# Patient Record
Sex: FEMALE | Race: WHITE | NOT HISPANIC OR LATINO | Employment: FULL TIME | URBAN - METROPOLITAN AREA
[De-identification: names, ages, dates, MRNs, and addresses within clinical notes are randomized per-mention and may not be internally consistent; named-entity substitution may affect disease eponyms.]

---

## 2020-11-19 ENCOUNTER — HOSPITAL ENCOUNTER (OUTPATIENT)
Dept: URGENT CARE | Facility: CLINIC | Age: 20
Discharge: HOME | End: 2020-11-19
Attending: PHYSICIAN ASSISTANT

## 2020-11-22 LAB — SARS-COV-2 RNA SPEC QL NAA+PROBE: NOT DETECTED

## 2021-03-11 ENCOUNTER — OFFICE VISIT (OUTPATIENT)
Dept: FAMILY MEDICINE CLINIC | Facility: CLINIC | Age: 21
End: 2021-03-11

## 2021-03-11 VITALS
WEIGHT: 193 LBS | DIASTOLIC BLOOD PRESSURE: 78 MMHG | OXYGEN SATURATION: 98 % | RESPIRATION RATE: 16 BRPM | HEART RATE: 62 BPM | TEMPERATURE: 96.6 F | SYSTOLIC BLOOD PRESSURE: 128 MMHG | BODY MASS INDEX: 35.51 KG/M2 | HEIGHT: 62 IN

## 2021-03-11 DIAGNOSIS — Z11.59 NEED FOR HEPATITIS C SCREENING TEST: ICD-10-CM

## 2021-03-11 DIAGNOSIS — F32.A DEPRESSION, UNSPECIFIED DEPRESSION TYPE: ICD-10-CM

## 2021-03-11 DIAGNOSIS — N92.6 IRREGULAR PERIODS: ICD-10-CM

## 2021-03-11 DIAGNOSIS — Z00.00 ANNUAL PHYSICAL EXAM: Primary | ICD-10-CM

## 2021-03-11 DIAGNOSIS — Z13.220 SCREENING FOR HYPERLIPIDEMIA: ICD-10-CM

## 2021-03-11 PROBLEM — L72.3 SEBACEOUS CYST: Status: ACTIVE | Noted: 2021-01-05

## 2021-03-11 LAB
BASOPHILS # BLD AUTO: 0.06 10*3/MM3 (ref 0–0.2)
BASOPHILS NFR BLD AUTO: 0.9 % (ref 0–1.5)
EOSINOPHIL # BLD AUTO: 0.58 10*3/MM3 (ref 0–0.4)
EOSINOPHIL NFR BLD AUTO: 9.1 % (ref 0.3–6.2)
ERYTHROCYTE [DISTWIDTH] IN BLOOD BY AUTOMATED COUNT: 11.9 % (ref 12.3–15.4)
HCT VFR BLD AUTO: 42.9 % (ref 34–46.6)
HGB BLD-MCNC: 14.3 G/DL (ref 12–15.9)
IMM GRANULOCYTES # BLD AUTO: 0.01 10*3/MM3 (ref 0–0.05)
IMM GRANULOCYTES NFR BLD AUTO: 0.2 % (ref 0–0.5)
LYMPHOCYTES # BLD AUTO: 2.15 10*3/MM3 (ref 0.7–3.1)
LYMPHOCYTES NFR BLD AUTO: 33.7 % (ref 19.6–45.3)
MCH RBC QN AUTO: 30.6 PG (ref 26.6–33)
MCHC RBC AUTO-ENTMCNC: 33.3 G/DL (ref 31.5–35.7)
MCV RBC AUTO: 91.9 FL (ref 79–97)
MONOCYTES # BLD AUTO: 0.4 10*3/MM3 (ref 0.1–0.9)
MONOCYTES NFR BLD AUTO: 6.3 % (ref 5–12)
NEUTROPHILS # BLD AUTO: 3.18 10*3/MM3 (ref 1.7–7)
NEUTROPHILS NFR BLD AUTO: 49.8 % (ref 42.7–76)
NRBC BLD AUTO-RTO: 0 /100 WBC (ref 0–0.2)
PLATELET # BLD AUTO: 282 10*3/MM3 (ref 140–450)
RBC # BLD AUTO: 4.67 10*6/MM3 (ref 3.77–5.28)
WBC # BLD AUTO: 6.38 10*3/MM3 (ref 3.4–10.8)

## 2021-03-11 PROCEDURE — 99385 PREV VISIT NEW AGE 18-39: CPT | Performed by: NURSE PRACTITIONER

## 2021-03-11 NOTE — PATIENT INSTRUCTIONS
You will need to call [facility] to make an appointment.   I will call you with your lab results.   Please call with any questions or concerns.   Return for Annual, Labs.    Annual Wellness  Personal Prevention Plan of Service     Date of Office Visit:  2021  Encounter Provider:  MANE Devlin  Place of Service:  [facility]  Patient Name: Liz Pool  :  2000    As part of the Annual Wellness portion of your visit today, we are providing you with this personalized preventive plan of services (PPPS). This plan is based upon recommendations of the [place] Preventive Services Task Force (USPSTF) and the Advisory Committee on Immunization Practices (ACIP).    This lists the preventive care services that should be considered, and provides dates of when you are due. Items listed as completed are up-to-date and do not require any further intervention.    Health Maintenance   Topic Date Due   • HPV VACCINES (1 - 2-dose series) Never done   • TDAP/TD VACCINES (2 - Td) 2018   • HEPATITIS C SCREENING  Never done   • INFLUENZA VACCINE  2022 (Originally 2020)   • CHLAMYDIA SCREENING  2022 (Originally 3/11/2021)   • ANNUAL PHYSICAL  2022   • Pneumococcal Vaccine 0-64  Aged Out   • MENINGOCOCCAL VACCINE  Aged Out       Orders Placed This Encounter   Procedures   • Comprehensive Metabolic Panel   • Hepatitis C Antibody   • Lipid Panel With / Chol / HDL Ratio   • TSH   • Ambulatory Referral to Obstetrics / Gynecology     Referral Priority:   Routine     Referral Type:   Consultation     Referral Reason:   Specialty Services Required     Referred to Provider:   Lizette Sandoval MD     Requested Specialty:   Obstetrics and Gynecology     Number of Visits Requested:   1   • Ambulatory Referral to Psychiatry     Referral Priority:   Routine     Referral Type:   Behavorial Health/Psych     Referral Reason:   Specialty  Services Required     Requested Specialty:   Psychiatry     Number of Visits Requested:   1   • CBC & Differential     Order Specific Question:   Manual Differential     Answer:   No       Return for Annual, Labs.

## 2021-03-11 NOTE — PROGRESS NOTES
"Preventive Exam    History of Present Illness: Liz Pool is a 20 y.o. here for check up and review of routine health maintenance. she states she is doing well and has concerns with depression. She reports that her mother and father are both bipolar and would like screening.     Patient also reports irregular periods. She states that she has one \"every other month\". She states that this issue has worsened over the last year. She reports that she will have a period for about \"2 weeks\" once she does have one\". Patient reports that she started her period at age 9.     Past medical history, surgical history and family history have been reviewed.     Review of Systems   Constitutional: Positive for fatigue. Negative for appetite change, chills and fever.   HENT: Negative for congestion, dental problem, postnasal drip, rhinorrhea, sinus pressure and sore throat.         Dental exam is up to date.    Eyes: Negative.  Negative for blurred vision, double vision and photophobia.        Wears glasses. Eye exam is up to date.    Respiratory: Negative for cough, chest tightness, shortness of breath and wheezing.    Cardiovascular: Negative for chest pain, palpitations and leg swelling.   Gastrointestinal: Positive for indigestion. Negative for abdominal pain, constipation, diarrhea, nausea and vomiting.   Endocrine: Negative.  Negative for cold intolerance and heat intolerance.   Genitourinary: Positive for menstrual problem (irregular) and pelvic pain (with menstrual cycle. ). Negative for breast discharge, breast lump, breast pain, difficulty urinating, frequency, pelvic pressure, vaginal bleeding and vaginal discharge.   Musculoskeletal: Negative for arthralgias, back pain, joint swelling and myalgias.   Skin: Negative.    Allergic/Immunologic: Positive for food allergies (lactose intolerant). Negative for environmental allergies.   Neurological: Negative for dizziness, weakness, numbness and headache. "   Hematological: Bruises/bleeds easily.   Psychiatric/Behavioral: Positive for sleep disturbance and depressed mood. Negative for self-injury and suicidal ideas. The patient is nervous/anxious.        PHYSICAL EXAM    Vitals:    03/11/21 1035   BP: 128/78   Pulse: 62   Resp: 16   Temp: 96.6 °F (35.9 °C)   SpO2: 98%     Body mass index is 35.52 kg/m².      Physical Exam  Vitals and nursing note reviewed.   Constitutional:       Appearance: Normal appearance. She is well-developed. She is obese.   HENT:      Head: Normocephalic and atraumatic.      Right Ear: Tympanic membrane, ear canal and external ear normal.      Left Ear: Tympanic membrane, ear canal and external ear normal.      Nose: Nose normal.      Mouth/Throat:      Lips: Pink.      Mouth: Mucous membranes are moist.      Tongue: No lesions.      Palate: No mass and lesions.      Pharynx: Oropharynx is clear. Uvula midline.      Tonsils: No tonsillar exudate.   Eyes:      Conjunctiva/sclera: Conjunctivae normal.      Pupils: Pupils are equal, round, and reactive to light.   Neck:      Thyroid: No thyromegaly.   Cardiovascular:      Rate and Rhythm: Normal rate and regular rhythm.      Pulses: Normal pulses.           Dorsalis pedis pulses are 2+ on the right side and 2+ on the left side.        Posterior tibial pulses are 2+ on the right side and 2+ on the left side.      Heart sounds: Normal heart sounds. No murmur.   Pulmonary:      Effort: Pulmonary effort is normal.      Breath sounds: Normal breath sounds.   Abdominal:      General: Bowel sounds are normal. There is no distension.      Palpations: Abdomen is soft.      Tenderness: There is no abdominal tenderness.   Musculoskeletal:         General: No deformity. Normal range of motion.      Cervical back: Normal range of motion and neck supple.      Right lower leg: No edema.      Left lower leg: No edema.   Lymphadenopathy:      Head:      Right side of head: No submental, submandibular, tonsillar,  preauricular, posterior auricular or occipital adenopathy.      Left side of head: No submental, submandibular, tonsillar, preauricular, posterior auricular or occipital adenopathy.      Cervical: No cervical adenopathy.      Right cervical: No superficial, deep or posterior cervical adenopathy.     Left cervical: No superficial, deep or posterior cervical adenopathy.      Upper Body:      Right upper body: No supraclavicular adenopathy.      Left upper body: No supraclavicular adenopathy.   Skin:     General: Skin is warm and dry.      Capillary Refill: Capillary refill takes 2 to 3 seconds.   Neurological:      General: No focal deficit present.      Mental Status: She is alert and oriented to person, place, and time.      Cranial Nerves: Cranial nerves are intact. No cranial nerve deficit.      Sensory: Sensation is intact.      Motor: Motor function is intact.      Coordination: Coordination is intact.      Gait: Gait is intact.   Psychiatric:         Attention and Perception: Attention and perception normal.         Mood and Affect: Mood and affect normal.         Speech: Speech normal.         Behavior: Behavior normal. Behavior is cooperative.         Thought Content: Thought content normal.         Cognition and Memory: Cognition and memory normal.         Judgment: Judgment normal.         Procedures    Diagnoses and all orders for this visit:    1. Annual physical exam (Primary)  -     CBC & Differential  -     Comprehensive Metabolic Panel  -     Hepatitis C Antibody  -     Lipid Panel With / Chol / HDL Ratio    2. Screening for hyperlipidemia  -     Lipid Panel With / Chol / HDL Ratio    3. Need for hepatitis C screening test  -     Hepatitis C Antibody    4. Depression, unspecified depression type  -     CBC & Differential  -     Comprehensive Metabolic Panel  -     TSH  -     Ambulatory Referral to Psychiatry    5. Irregular periods  -     Ambulatory Referral to Obstetrics / Gynecology        Problems  Addressed this Visit     None      Visit Diagnoses     Annual physical exam    -  Primary    Relevant Orders    CBC & Differential    Comprehensive Metabolic Panel    Hepatitis C Antibody    Lipid Panel With / Chol / HDL Ratio    Screening for hyperlipidemia        Relevant Orders    Lipid Panel With / Chol / HDL Ratio    Need for hepatitis C screening test        Relevant Orders    Hepatitis C Antibody    Depression, unspecified depression type        Relevant Orders    CBC & Differential    Comprehensive Metabolic Panel    TSH    Ambulatory Referral to Psychiatry (Completed)    Irregular periods        Relevant Orders    Ambulatory Referral to Obstetrics / Gynecology      Diagnoses       Codes Comments    Annual physical exam    -  Primary ICD-10-CM: Z00.00  ICD-9-CM: V70.0     Screening for hyperlipidemia     ICD-10-CM: Z13.220  ICD-9-CM: V77.91     Need for hepatitis C screening test     ICD-10-CM: Z11.59  ICD-9-CM: V73.89     Depression, unspecified depression type     ICD-10-CM: F32.9  ICD-9-CM: 311     Irregular periods     ICD-10-CM: N92.6  ICD-9-CM: 626.4         Patient screened positive for depression based on a PHQ-9 score of 12 on 3/11/2021. Follow-up recommendations include: Referral to Mental Health specialist.  Routine health maintenance reviewed and discussed with Liz Pool.    Preventative counseling regarding healthy diet and exercise.   Pt reports that he wears a seatbelt regularly.    Return for Annual, Labs.     Patient Instructions     You will need to call [facility] to make an appointment.   I will call you with your lab results.   Please call with any questions or concerns.   Return for Annual, Labs.    Annual Wellness  Personal Prevention Plan of Service     Date of Office Visit:  2021  Encounter Provider:  MANE Devlin  Place of Service:  [facility]  Patient Name: Liz Pool  :  2000    As part  of the Annual Wellness portion of your visit today, we are providing you with this personalized preventive plan of services (PPPS). This plan is based upon recommendations of the [place] Preventive Services Task Force (USPSTF) and the Advisory Committee on Immunization Practices (ACIP).    This lists the preventive care services that should be considered, and provides dates of when you are due. Items listed as completed are up-to-date and do not require any further intervention.    Health Maintenance   Topic Date Due   • HPV VACCINES (1 - 2-dose series) Never done   • TDAP/TD VACCINES (2 - Td) 11/14/2018   • HEPATITIS C SCREENING  Never done   • INFLUENZA VACCINE  03/11/2022 (Originally 8/1/2020)   • CHLAMYDIA SCREENING  03/11/2022 (Originally 3/11/2021)   • ANNUAL PHYSICAL  03/12/2022   • Pneumococcal Vaccine 0-64  Aged Out   • MENINGOCOCCAL VACCINE  Aged Out       Orders Placed This Encounter   Procedures   • Comprehensive Metabolic Panel   • Hepatitis C Antibody   • Lipid Panel With / Chol / HDL Ratio   • TSH   • Ambulatory Referral to Obstetrics / Gynecology     Referral Priority:   Routine     Referral Type:   Consultation     Referral Reason:   Specialty Services Required     Referred to Provider:   Lizette Sandoval MD     Requested Specialty:   Obstetrics and Gynecology     Number of Visits Requested:   1   • Ambulatory Referral to Psychiatry     Referral Priority:   Routine     Referral Type:   Behavorial Health/Psych     Referral Reason:   Specialty Services Required     Requested Specialty:   Psychiatry     Number of Visits Requested:   1   • CBC & Differential     Order Specific Question:   Manual Differential     Answer:   No       Return for Annual, Labs.

## 2021-03-12 LAB
ALBUMIN SERPL-MCNC: 4.8 G/DL (ref 3.5–5.2)
ALBUMIN/GLOB SERPL: 1.8 G/DL
ALP SERPL-CCNC: 72 U/L (ref 39–117)
ALT SERPL-CCNC: 15 U/L (ref 1–33)
AST SERPL-CCNC: 17 U/L (ref 1–32)
BILIRUB SERPL-MCNC: 0.4 MG/DL (ref 0–1.2)
BUN SERPL-MCNC: 9 MG/DL (ref 6–20)
BUN/CREAT SERPL: 14.1 (ref 7–25)
CALCIUM SERPL-MCNC: 9.7 MG/DL (ref 8.6–10.5)
CHLORIDE SERPL-SCNC: 102 MMOL/L (ref 98–107)
CHOLEST SERPL-MCNC: 217 MG/DL (ref 0–200)
CHOLEST/HDLC SERPL: 7 {RATIO}
CO2 SERPL-SCNC: 27 MMOL/L (ref 22–29)
CREAT SERPL-MCNC: 0.64 MG/DL (ref 0.57–1)
GLOBULIN SER CALC-MCNC: 2.6 GM/DL
GLUCOSE SERPL-MCNC: 87 MG/DL (ref 65–99)
HCV AB S/CO SERPL IA: <0.1 S/CO RATIO (ref 0–0.9)
HDLC SERPL-MCNC: 31 MG/DL (ref 40–60)
LDLC SERPL CALC-MCNC: 158 MG/DL (ref 0–100)
POTASSIUM SERPL-SCNC: 4.5 MMOL/L (ref 3.5–5.2)
PROT SERPL-MCNC: 7.4 G/DL (ref 6–8.5)
SODIUM SERPL-SCNC: 139 MMOL/L (ref 136–145)
TRIGL SERPL-MCNC: 150 MG/DL (ref 0–150)
TSH SERPL DL<=0.005 MIU/L-ACNC: 1.53 UIU/ML (ref 0.27–4.2)
VLDLC SERPL CALC-MCNC: 28 MG/DL (ref 5–40)

## 2021-03-17 ENCOUNTER — OFFICE VISIT (OUTPATIENT)
Dept: OBSTETRICS AND GYNECOLOGY | Age: 21
End: 2021-03-17

## 2021-03-17 VITALS
DIASTOLIC BLOOD PRESSURE: 68 MMHG | WEIGHT: 195 LBS | SYSTOLIC BLOOD PRESSURE: 110 MMHG | HEIGHT: 63 IN | BODY MASS INDEX: 34.55 KG/M2

## 2021-03-17 DIAGNOSIS — N92.1 MENORRHAGIA WITH IRREGULAR CYCLE: ICD-10-CM

## 2021-03-17 DIAGNOSIS — Z01.419 WELL WOMAN EXAM WITH ROUTINE GYNECOLOGICAL EXAM: Primary | ICD-10-CM

## 2021-03-17 DIAGNOSIS — N94.6 DYSMENORRHEA: ICD-10-CM

## 2021-03-17 DIAGNOSIS — N93.9 ABNORMAL UTERINE BLEEDING (AUB): ICD-10-CM

## 2021-03-17 PROCEDURE — 99213 OFFICE O/P EST LOW 20 MIN: CPT | Performed by: OBSTETRICS & GYNECOLOGY

## 2021-03-17 PROCEDURE — 99385 PREV VISIT NEW AGE 18-39: CPT | Performed by: OBSTETRICS & GYNECOLOGY

## 2021-03-17 RX ORDER — NORETHINDRONE ACETATE AND ETHINYL ESTRADIOL 1; .02 MG/1; MG/1
1 TABLET ORAL DAILY
Qty: 21 TABLET | Refills: 12 | Status: SHIPPED | OUTPATIENT
Start: 2021-03-17 | End: 2022-03-15

## 2021-03-17 NOTE — PROGRESS NOTES
"Chief complaint: annual    Subjective   History of Present Illness    Liz Pool is a 20 y.o.  who presents for annual exam. Referred by primary care for irregular menses. TSH wnl. Menarche age 9. Had normal menses for several years. Menses became irregular about 4 yrs ago.  Her menses are q1-3 mo, last 5-10 days, +menorrhagia for 5 days, +cramping. 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         Current contraception: NA  History of abnormal Pap smear: no  Received Gardasil immunization: yes -    Perform regular self breast exam:    Family history of uterine or ovarian cancer: no  Family History of colon cancer: no  Family history of breast cancer: no    Mammogram: not indicated.  Colonoscopy: not indicated.  DEXA: not indicated.    Exercise: moderately active  Calcium/Vitamin D: adequate intake    The following portions of the patient's history were reviewed and updated as appropriate: allergies, current medications, past family history, past medical history, past social history, past surgical history and problem list.    Review of Systems   Constitutional: Negative.    Genitourinary: Positive for menstrual problem.   Psychiatric/Behavioral: Positive for dysphoric mood.       Pertinent items are noted in HPI.     Objective   Physical Exam    /68   Ht 160 cm (63\")   Wt 88.5 kg (195 lb)   LMP 2021 (Exact Date)   Breastfeeding No   BMI 34.54 kg/m²     General:   alert, " appears stated age and cooperative   Neck: no asymmetry, masses, or scars   Heart: regular rate and rhythm, S1, S2 normal, no murmur, click, rub or gallop   Lungs: clear to auscultation bilaterally   Abdomen: soft, non-tender, without masses or organomegaly   Breast: inspection negative, no nipple discharge or bleeding, no masses or nodularity palpable   Vulva: normal, Bartholin's, Urethra, [place]'s normal   Vagina: normal mucosa   Cervix: no bleeding following Pap, no cervical motion tenderness, no lesions and nulliparous appearance   Uterus: normal size, anteverted, mobile, non-tender, normal shape and consistency   Adnexa: normal adnexa and no mass, fullness, tenderness   Rectal: not indicated     Assessment/Plan   Diagnoses and all orders for this visit:    1. Well woman exam with routine gynecological exam (Primary)  -     IGP, Rfx Aptima HPV ASCU    2. Abnormal uterine bleeding (AUB)  -     norethindrone-ethinyl estradiol (Loestrin 1/20, 21,) 1-20 MG-MCG per tablet; Take 1 tablet by mouth Daily.  Dispense: 21 tablet; Refill: 12    3. Menorrhagia with irregular cycle  -     norethindrone-ethinyl estradiol (Loestrin 1/20, 21,) 1-20 MG-MCG per tablet; Take 1 tablet by mouth Daily.  Dispense: 21 tablet; Refill: 12    4. Dysmenorrhea  -     norethindrone-ethinyl estradiol (Loestrin 1/20, 21,) 1-20 MG-MCG per tablet; Take 1 tablet by mouth Daily.  Dispense: 21 tablet; Refill: 12    likely anovulatory cycles, recommend tx to prevent long term complications (endometrial hyperplasia and cancer)  Could also use cyclic progesterone or progesterone only pills. Declines IUD    Reviewed risks/benefits of ocps- DVT,PE, stroke, check BP after starting it    Breast self exam technique reviewed and patient encouraged to perform self-exam monthly.  Discussed healthy lifestyle modifications.  Pap smear sent    Discussed recommendation to quit vaping

## 2021-03-19 LAB
CONV .: NORMAL
CYTOLOGIST CVX/VAG CYTO: NORMAL
CYTOLOGY CVX/VAG DOC CYTO: NORMAL
CYTOLOGY CVX/VAG DOC THIN PREP: NORMAL
DX ICD CODE: NORMAL
HIV 1 & 2 AB SER-IMP: NORMAL
OTHER STN SPEC: NORMAL
STAT OF ADQ CVX/VAG CYTO-IMP: NORMAL

## 2021-03-22 ENCOUNTER — TELEPHONE (OUTPATIENT)
Dept: OBSTETRICS AND GYNECOLOGY | Age: 21
End: 2021-03-22

## 2021-04-16 ENCOUNTER — BULK ORDERING (OUTPATIENT)
Dept: CASE MANAGEMENT | Facility: OTHER | Age: 21
End: 2021-04-16

## 2021-04-16 DIAGNOSIS — Z23 IMMUNIZATION DUE: ICD-10-CM

## 2021-06-02 ENCOUNTER — APPOINTMENT (OUTPATIENT)
Dept: GENERAL RADIOLOGY | Facility: HOSPITAL | Age: 21
End: 2021-06-02

## 2021-06-02 ENCOUNTER — HOSPITAL ENCOUNTER (EMERGENCY)
Facility: HOSPITAL | Age: 21
Discharge: HOME | End: 2021-06-02
Attending: EMERGENCY MEDICINE | Admitting: EMERGENCY MEDICINE

## 2021-06-02 VITALS
WEIGHT: 181.2 LBS | DIASTOLIC BLOOD PRESSURE: 75 MMHG | HEIGHT: 63 IN | BODY MASS INDEX: 32.11 KG/M2 | RESPIRATION RATE: 16 BRPM | HEART RATE: 52 BPM | OXYGEN SATURATION: 99 % | TEMPERATURE: 98.3 F | SYSTOLIC BLOOD PRESSURE: 116 MMHG

## 2021-06-02 DIAGNOSIS — R07.89 ATYPICAL CHEST PAIN: Primary | ICD-10-CM

## 2021-06-02 DIAGNOSIS — F41.1 GENERALIZED ANXIETY DISORDER: ICD-10-CM

## 2021-06-02 LAB
ALBUMIN SERPL-MCNC: 4.5 G/DL (ref 3.5–5.2)
ALBUMIN/GLOB SERPL: 1.9 G/DL
ALP SERPL-CCNC: 63 U/L (ref 39–117)
ALT SERPL W P-5'-P-CCNC: 13 U/L (ref 1–33)
ANION GAP SERPL CALCULATED.3IONS-SCNC: 8.3 MMOL/L (ref 5–15)
AST SERPL-CCNC: 15 U/L (ref 1–32)
BASOPHILS # BLD AUTO: 0.04 10*3/MM3 (ref 0–0.2)
BASOPHILS NFR BLD AUTO: 0.6 % (ref 0–1.5)
BILIRUB SERPL-MCNC: 0.4 MG/DL (ref 0–1.2)
BUN SERPL-MCNC: 8 MG/DL (ref 6–20)
BUN/CREAT SERPL: 10.7 (ref 7–25)
CALCIUM SPEC-SCNC: 9.1 MG/DL (ref 8.6–10.5)
CHLORIDE SERPL-SCNC: 105 MMOL/L (ref 98–107)
CO2 SERPL-SCNC: 26.7 MMOL/L (ref 22–29)
CREAT SERPL-MCNC: 0.75 MG/DL (ref 0.57–1)
D DIMER PPP FEU-MCNC: <0.27 MCGFEU/ML (ref 0–0.49)
DEPRECATED RDW RBC AUTO: 37 FL (ref 37–54)
EOSINOPHIL # BLD AUTO: 0.37 10*3/MM3 (ref 0–0.4)
EOSINOPHIL NFR BLD AUTO: 5.2 % (ref 0.3–6.2)
ERYTHROCYTE [DISTWIDTH] IN BLOOD BY AUTOMATED COUNT: 11.6 % (ref 12.3–15.4)
GFR SERPL CREATININE-BSD FRML MDRD: 97 ML/MIN/1.73
GLOBULIN UR ELPH-MCNC: 2.4 GM/DL
GLUCOSE SERPL-MCNC: 80 MG/DL (ref 65–99)
HCG SERPL QL: NEGATIVE
HCT VFR BLD AUTO: 38.4 % (ref 34–46.6)
HGB BLD-MCNC: 13.3 G/DL (ref 12–15.9)
HOLD SPECIMEN: NORMAL
IMM GRANULOCYTES # BLD AUTO: 0.02 10*3/MM3 (ref 0–0.05)
IMM GRANULOCYTES NFR BLD AUTO: 0.3 % (ref 0–0.5)
LYMPHOCYTES # BLD AUTO: 2.4 10*3/MM3 (ref 0.7–3.1)
LYMPHOCYTES NFR BLD AUTO: 33.8 % (ref 19.6–45.3)
MCH RBC QN AUTO: 30.9 PG (ref 26.6–33)
MCHC RBC AUTO-ENTMCNC: 34.6 G/DL (ref 31.5–35.7)
MCV RBC AUTO: 89.3 FL (ref 79–97)
MONOCYTES # BLD AUTO: 0.51 10*3/MM3 (ref 0.1–0.9)
MONOCYTES NFR BLD AUTO: 7.2 % (ref 5–12)
NEUTROPHILS NFR BLD AUTO: 3.77 10*3/MM3 (ref 1.7–7)
NEUTROPHILS NFR BLD AUTO: 52.9 % (ref 42.7–76)
NRBC BLD AUTO-RTO: 0 /100 WBC (ref 0–0.2)
PLATELET # BLD AUTO: 243 10*3/MM3 (ref 140–450)
PMV BLD AUTO: 10.5 FL (ref 6–12)
POTASSIUM SERPL-SCNC: 4.1 MMOL/L (ref 3.5–5.2)
PROT SERPL-MCNC: 6.9 G/DL (ref 6–8.5)
RBC # BLD AUTO: 4.3 10*6/MM3 (ref 3.77–5.28)
SODIUM SERPL-SCNC: 140 MMOL/L (ref 136–145)
TROPONIN T SERPL-MCNC: <0.01 NG/ML (ref 0–0.03)
WBC # BLD AUTO: 7.11 10*3/MM3 (ref 3.4–10.8)
WHOLE BLOOD HOLD SPECIMEN: NORMAL
WHOLE BLOOD HOLD SPECIMEN: NORMAL

## 2021-06-02 PROCEDURE — 93010 ELECTROCARDIOGRAM REPORT: CPT | Performed by: INTERNAL MEDICINE

## 2021-06-02 PROCEDURE — 71046 X-RAY EXAM CHEST 2 VIEWS: CPT

## 2021-06-02 PROCEDURE — 85025 COMPLETE CBC W/AUTO DIFF WBC: CPT

## 2021-06-02 PROCEDURE — 93005 ELECTROCARDIOGRAM TRACING: CPT

## 2021-06-02 PROCEDURE — 80053 COMPREHEN METABOLIC PANEL: CPT

## 2021-06-02 PROCEDURE — 36415 COLL VENOUS BLD VENIPUNCTURE: CPT

## 2021-06-02 PROCEDURE — 84484 ASSAY OF TROPONIN QUANT: CPT

## 2021-06-02 PROCEDURE — 93005 ELECTROCARDIOGRAM TRACING: CPT | Performed by: EMERGENCY MEDICINE

## 2021-06-02 PROCEDURE — 99283 EMERGENCY DEPT VISIT LOW MDM: CPT

## 2021-06-02 PROCEDURE — 84703 CHORIONIC GONADOTROPIN ASSAY: CPT

## 2021-06-02 PROCEDURE — 85379 FIBRIN DEGRADATION QUANT: CPT | Performed by: EMERGENCY MEDICINE

## 2021-06-02 RX ORDER — ASPIRIN 325 MG
325 TABLET ORAL ONCE
Status: DISCONTINUED | OUTPATIENT
Start: 2021-06-02 | End: 2021-06-03 | Stop reason: HOSPADM

## 2021-06-02 RX ORDER — SODIUM CHLORIDE 0.9 % (FLUSH) 0.9 %
10 SYRINGE (ML) INJECTION AS NEEDED
Status: DISCONTINUED | OUTPATIENT
Start: 2021-06-02 | End: 2021-06-03 | Stop reason: HOSPADM

## 2021-06-02 NOTE — ED PROVIDER NOTES
" EMERGENCY DEPARTMENT ENCOUNTER    Room Number:  26/26  Date of encounter:  6/2/2021  PCP: Mojgan Machuca APRN  Historian: Patient      HPI:  Chief Complaint: Chest pain  A complete HPI/ROS/PMH/PSH/SH/FH are unobtainable due to: N/A    Context: Liz Pool is a 20 y.o. female who presents to the ED c/o sudden onset of right-sided chest tightness that started this afternoon around 2 PM while she was driving in the rain.  She states she felt very anxious and is the \"walls were closing in\".  She did have some tingling in her hands as well.  She has been dealing with a lot of anxiety related to work in her personal life lately, but she has never had a panic attack.  No recent fevers chills, cough, congestion, nausea, vomiting or diarrhea.  She takes birth control pills and vapes \"on occasion\".  No prior history of DVT or PE.      The patient was placed in a mask in triage, hand hygiene was performed before and after my interaction with the patient.  I wore a mask, safety glasses and gloves during my entire interaction with the patient.    PAST MEDICAL HISTORY  Active Ambulatory Problems     Diagnosis Date Noted   • Sebaceous cyst 01/05/2021     Resolved Ambulatory Problems     Diagnosis Date Noted   • No Resolved Ambulatory Problems     Past Medical History:   Diagnosis Date   • Anxiety    • Depression          PAST SURGICAL HISTORY  Past Surgical History:   Procedure Laterality Date   • WISDOM TOOTH EXTRACTION           FAMILY HISTORY  Family History   Problem Relation Age of Onset   • Bipolar disorder Mother    • Diabetes Mother    • Hypertension Mother    • Bipolar disorder Father    • Diabetes Father    • Hypertension Father    • Breast cancer Neg Hx    • Ovarian cancer Neg Hx    • Endometrial cancer Neg Hx    • Colon cancer Neg Hx    • Deep vein thrombosis Neg Hx    • Pulmonary embolism Neg Hx          SOCIAL HISTORY  Social History     Socioeconomic History   • Marital status: Single     Spouse name: " Not on file   • Number of children: Not on file   • Years of education: Not on file   • Highest education level: Not on file   Tobacco Use   • Smoking status: Former Smoker     Types: Cigarettes   • Smokeless tobacco: Current User   Vaping Use   • Vaping Use: Every day   • Substances: Nicotine   • Devices: Disposable   Substance and Sexual Activity   • Alcohol use: Yes     Alcohol/week: 1.0 standard drinks     Types: 1 Cans of beer per week     Comment: Occasionally   • Drug use: Yes     Types: Marijuana     Comment: occasionally   • Sexual activity: Yes     Partners: Female     Comment: on birth control pill         ALLERGIES  Patient has no known allergies.        REVIEW OF SYSTEMS  Review of Systems   Constitutional: Negative for chills and fever.   Respiratory: Positive for chest tightness. Negative for shortness of breath.    Cardiovascular: Negative for chest pain and leg swelling.   Gastrointestinal: Negative for abdominal pain, diarrhea, nausea and vomiting.   Neurological: Positive for numbness.   Psychiatric/Behavioral: Negative for suicidal ideas.        All systems reviewed and negative except for those discussed in HPI.       PHYSICAL EXAM    I have reviewed the triage vital signs and nursing notes.    ED Triage Vitals [06/02/21 1714]   Temp Heart Rate Resp BP SpO2   98.3 °F (36.8 °C) 97 16 140/96 98 %      Temp src Heart Rate Source Patient Position BP Location FiO2 (%)   Tympanic -- -- -- --       Physical Exam   Constitutional: Pt. is oriented to person, place, and time and well-developed, well-nourished, and in no distress.  HENT: Normocephalic and atraumatic.  Neck: Normal range of motion. Neck supple. No JVD present. No tracheal deviation present. No thyromegaly present.   Cardiovascular: Normal rate, regular rhythm and normal heart sounds. Exam reveals no gallop and no friction rub.   No murmur heard.  Pulmonary/Chest: Effort normal and breath sounds normal. No stridor. No respiratory distress.  No wheezes, no rales.   Abdominal: Soft. Bowel sounds are normal. No distension. There is no tenderness. There is no rebound and no guarding.   Musculoskeletal: Normal range of motion. No edema, tenderness or deformity.   Neurological: Pt. is alert and oriented to person, place, and time.  There are no focal neurologic deficits.  Skin: Skin is warm and dry. No rash noted. Pt. is not diaphoretic. No erythema.   Psychiatric: Mood, affect and judgment normal.  She is pleasant and cooperative.  Nursing note and vitals reviewed.        LAB RESULTS  Recent Results (from the past 24 hour(s))   ECG 12 Lead    Collection Time: 06/02/21  5:30 PM   Result Value Ref Range    QT Interval 386 ms   Comprehensive Metabolic Panel    Collection Time: 06/02/21  5:50 PM    Specimen: Blood   Result Value Ref Range    Glucose 80 65 - 99 mg/dL    BUN 8 6 - 20 mg/dL    Creatinine 0.75 0.57 - 1.00 mg/dL    Sodium 140 136 - 145 mmol/L    Potassium 4.1 3.5 - 5.2 mmol/L    Chloride 105 98 - 107 mmol/L    CO2 26.7 22.0 - 29.0 mmol/L    Calcium 9.1 8.6 - 10.5 mg/dL    Total Protein 6.9 6.0 - 8.5 g/dL    Albumin 4.50 3.50 - 5.20 g/dL    ALT (SGPT) 13 1 - 33 U/L    AST (SGOT) 15 1 - 32 U/L    Alkaline Phosphatase 63 39 - 117 U/L    Total Bilirubin 0.4 0.0 - 1.2 mg/dL    eGFR Non African Amer 97 >60 mL/min/1.73    Globulin 2.4 gm/dL    A/G Ratio 1.9 g/dL    BUN/Creatinine Ratio 10.7 7.0 - 25.0    Anion Gap 8.3 5.0 - 15.0 mmol/L   Troponin    Collection Time: 06/02/21  5:50 PM    Specimen: Blood   Result Value Ref Range    Troponin T <0.010 0.000 - 0.030 ng/mL   hCG, Serum, Qualitative    Collection Time: 06/02/21  5:50 PM    Specimen: Blood   Result Value Ref Range    HCG Qualitative Negative Negative   Light Blue Top    Collection Time: 06/02/21  5:50 PM   Result Value Ref Range    Extra Tube hold for add-on    Green Top (Gel)    Collection Time: 06/02/21  5:50 PM   Result Value Ref Range    Extra Tube Hold for add-ons.    Lavender Top     Collection Time: 06/02/21  5:50 PM   Result Value Ref Range    Extra Tube hold for add-on    CBC Auto Differential    Collection Time: 06/02/21  5:50 PM    Specimen: Blood   Result Value Ref Range    WBC 7.11 3.40 - 10.80 10*3/mm3    RBC 4.30 3.77 - 5.28 10*6/mm3    Hemoglobin 13.3 12.0 - 15.9 g/dL    Hematocrit 38.4 34.0 - 46.6 %    MCV 89.3 79.0 - 97.0 fL    MCH 30.9 26.6 - 33.0 pg    MCHC 34.6 31.5 - 35.7 g/dL    RDW 11.6 (L) 12.3 - 15.4 %    RDW-SD 37.0 37.0 - 54.0 fl    MPV 10.5 6.0 - 12.0 fL    Platelets 243 140 - 450 10*3/mm3    Neutrophil % 52.9 42.7 - 76.0 %    Lymphocyte % 33.8 19.6 - 45.3 %    Monocyte % 7.2 5.0 - 12.0 %    Eosinophil % 5.2 0.3 - 6.2 %    Basophil % 0.6 0.0 - 1.5 %    Immature Grans % 0.3 0.0 - 0.5 %    Neutrophils, Absolute 3.77 1.70 - 7.00 10*3/mm3    Lymphocytes, Absolute 2.40 0.70 - 3.10 10*3/mm3    Monocytes, Absolute 0.51 0.10 - 0.90 10*3/mm3    Eosinophils, Absolute 0.37 0.00 - 0.40 10*3/mm3    Basophils, Absolute 0.04 0.00 - 0.20 10*3/mm3    Immature Grans, Absolute 0.02 0.00 - 0.05 10*3/mm3    nRBC 0.0 0.0 - 0.2 /100 WBC   D-dimer, Quantitative    Collection Time: 06/02/21  5:50 PM    Specimen: Blood   Result Value Ref Range    D-Dimer, Quantitative <0.27 0.00 - 0.49 MCGFEU/mL       Ordered the above labs and independently reviewed the results.        RADIOLOGY  XR Chest 2 View    Result Date: 6/2/2021  XR CHEST 2 VW-  HISTORY: Female who is 20 years-old,  chest pain  TECHNIQUE: Frontal and lateral views of the chest  COMPARISON: None available  FINDINGS: Heart, mediastinum and pulmonary vasculature are unremarkable. No focal pulmonary consolidation, pleural effusion, or pneumothorax. No acute osseous process.      No evidence for acute pulmonary process. Follow-up as clinical indications persist.  This report was finalized on 6/2/2021 5:46 PM by Dr. Partha Chandler M.D.        I ordered the above noted radiological studies. Reviewed by me and discussed with radiologist.  See  dictation for official radiology interpretation.      PROCEDURES    Procedures      MEDICATIONS GIVEN IN ER    Medications   sodium chloride 0.9 % flush 10 mL (has no administration in time range)   aspirin tablet 325 mg (has no administration in time range)         PROGRESS, DATA ANALYSIS, CONSULTS, AND MEDICAL DECISION MAKING    Any/all labs have been independently reviewed by me.  Any/all radiology studies have been reviewed by me and discussed with radiologist dictating the report.   EKG's independently viewed and interpreted by me.  Discussion below represents my analysis of pertinent findings related to patient's condition, differential diagnosis, treatment plan and final disposition.      ED Course as of Jun 02 2114 Wed Jun 02, 2021 1951 Chest x-ray is unremarkable.    [WC]   1951 EKG performed at 1730 and interpreted by me shows normal sinus rhythm with a heart 56 bpm.  AZ intervals, QRS complexes and ST-T segments are all unremarkable.  There are no prior EKGs available for comparison.    [WC]   2113 Normal.   D-Dimer, Quant: <0.27 [WC]      ED Course User Index  [WC] Ron Rodriguez MD       AS OF 21:14 EDT VITALS:    BP - 130/84  HR - 52  TEMP - 98.3 °F (36.8 °C) (Tympanic)  02 SATS - 98%        DIAGNOSIS  Final diagnoses:   Atypical chest pain   Generalized anxiety disorder         DISPOSITION  Discharged           Ron Rodriguez MD  06/02/21 2114

## 2021-06-02 NOTE — ED TRIAGE NOTES
C/O lightheaded, SOA and Chest tightness onset approx 2 hours PTA      Patient was placed in face mask in first look.  Patient was wearing a face mask throughout our encounter.  I wore protective eye protection throughout the encounter.  Hand hygiene was performed before and after patient encounter.

## 2021-06-03 LAB — QT INTERVAL: 386 MS

## 2021-06-03 NOTE — PROGRESS NOTES
Clinical Pharmacy Services: Medication History    Liz Pool is a 20 y.o. female presenting to [facility] for   Chief Complaint   Patient presents with   • Dizziness   • Chest Pain       She  has a past medical history of Anxiety and Depression.    Allergies as of 06/02/2021   • (No Known Allergies)       Medication information was obtained from: patient  Pharmacy and Phone Number:     Prior to Admission Medications     Prescriptions Last Dose Informant Patient Reported? Taking?    norethindrone-ethinyl estradiol (Loestrin 1/20, 21,) 1-20 MG-MCG per tablet  Self No Yes    Take 1 tablet by mouth Daily.            Medication notes:     This medication list is complete to the best of my knowledge as of 6/2/2021    Please call if questions.    Mojgan Cueva [facility]  Medication History Technician  [phone]    6/2/2021 20:34 EDT

## 2021-06-03 NOTE — ED NOTES
"Pt reports tightness in chest while driving, got home approx 1440 today and the tightness did not subside and she began to have a headache. Pt reports watching HR on apple watch and that \"it was going from 140 to 42.\" Pt drove herself to ED.     Tran Gunn, RN  06/02/21 2011    "

## 2022-03-15 ENCOUNTER — OFFICE VISIT (OUTPATIENT)
Dept: FAMILY MEDICINE CLINIC | Facility: CLINIC | Age: 22
End: 2022-03-15

## 2022-03-15 VITALS
SYSTOLIC BLOOD PRESSURE: 108 MMHG | WEIGHT: 179 LBS | HEART RATE: 62 BPM | OXYGEN SATURATION: 98 % | HEIGHT: 63 IN | BODY MASS INDEX: 31.71 KG/M2 | DIASTOLIC BLOOD PRESSURE: 78 MMHG
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
## 2022-03-15 DIAGNOSIS — F41.9 ANXIETY: ICD-10-CM

## 2022-03-15 DIAGNOSIS — Z00.00 ANNUAL PHYSICAL EXAM: Primary | ICD-10-CM

## 2022-03-15 DIAGNOSIS — Z13.228 ENCOUNTER FOR SCREENING FOR OTHER METABOLIC DISORDERS: ICD-10-CM

## 2022-03-15 DIAGNOSIS — Z13.220 SCREENING FOR HYPERLIPIDEMIA: ICD-10-CM

## 2022-03-15 PROBLEM — IMO0002 CLOSED FRACTURE OF MIDDLE OR PROXIMAL PHALANX OR PHALANGES OF HAND: Status: ACTIVE | Noted: 2022-03-15

## 2022-03-15 PROCEDURE — 99395 PREV VISIT EST AGE 18-39: CPT | Performed by: NURSE PRACTITIONER

## 2022-03-15 RX ORDER — AMOXICILLIN AND CLAVULANATE POTASSIUM 875; 125 MG/1; MG/1
1 TABLET, FILM COATED ORAL 2 TIMES DAILY
Qty: 20 TABLET | Refills: 0 | Status: SHIPPED | OUTPATIENT
Start: 2022-03-15 | End: 2022-03-25

## 2022-03-15 NOTE — PROGRESS NOTES
Preventive Exam    History of Present Illness: Liz Pool is a 21 y.o. here for check up and review of routine health maintenance. she states she is doing well and has the following concerns:    Patient has dog bite to her left lateral knee. Patient reports that she was bitten yesterday at work. She reports that the dog was playing, but broke skin and scraped. She reports that site was cleaned thoroughly.      Past medical history, surgical history and family history have been reviewed.     Review of Systems   Constitutional: Negative for appetite change, chills, fatigue, fever, unexpected weight gain and unexpected weight loss.   HENT: Negative for congestion, dental problem, hearing loss, postnasal drip, sinus pressure, sore throat, trouble swallowing and voice change.         Dental exam is due.    Eyes: Negative.  Negative for blurred vision, photophobia and discharge.   Respiratory: Negative for cough, chest tightness, shortness of breath and wheezing.    Cardiovascular: Negative for chest pain, palpitations and leg swelling.   Gastrointestinal: Negative for abdominal pain, constipation, diarrhea, nausea, vomiting and GERD.   Endocrine: Negative.  Negative for cold intolerance, heat intolerance, polydipsia, polyphagia and polyuria.   Genitourinary: Negative.  Negative for breast discharge, breast lump, breast pain, dysuria, pelvic pain, pelvic pressure, vaginal bleeding and vaginal discharge.   Musculoskeletal: Negative for arthralgias, back pain, joint swelling and myalgias.   Skin: Positive for rash.   Allergic/Immunologic: Negative.  Negative for environmental allergies and food allergies.   Neurological: Negative for dizziness, weakness, numbness and headache.   Hematological: Bruises/bleeds easily.   Psychiatric/Behavioral: Negative for sleep disturbance, suicidal ideas, depressed mood and stress. The patient is nervous/anxious.         Patient reports anxiety is controlled with medication.         PHYSICAL EXAM    Vitals:    03/15/22 1117   BP: 108/78   Pulse: 62   SpO2: 98%   Body mass index is 31.71 kg/m².      Physical Exam  Vitals and nursing note reviewed.   Constitutional:       Appearance: Normal appearance. She is well-developed and overweight.   HENT:      Head: Normocephalic and atraumatic.      Right Ear: Tympanic membrane, ear canal and external ear normal.      Left Ear: Tympanic membrane, ear canal and external ear normal.      Nose: Nose normal.      Mouth/Throat:      Lips: Pink.      Mouth: Mucous membranes are moist.      Tongue: No lesions.      Palate: No mass and lesions.      Pharynx: Oropharynx is clear. Uvula midline.      Tonsils: No tonsillar exudate.   Eyes:      Conjunctiva/sclera: Conjunctivae normal.      Pupils: Pupils are equal, round, and reactive to light.   Neck:      Thyroid: No thyromegaly.   Cardiovascular:      Rate and Rhythm: Normal rate and regular rhythm.      Pulses: Normal pulses.           Dorsalis pedis pulses are 2+ on the right side and 2+ on the left side.        Posterior tibial pulses are 2+ on the right side and 2+ on the left side.      Heart sounds: Normal heart sounds. No murmur heard.  Pulmonary:      Effort: Pulmonary effort is normal.      Breath sounds: Normal breath sounds.   Chest:   Breasts:      Right: No supraclavicular adenopathy.      Left: No supraclavicular adenopathy.       Abdominal:      General: Bowel sounds are normal. There is no distension.      Palpations: Abdomen is soft.      Tenderness: There is no abdominal tenderness.   Musculoskeletal:         General: No deformity. Normal range of motion.      Cervical back: Normal range of motion and neck supple.      Right lower leg: No edema.      Left lower leg: No edema.   Lymphadenopathy:      Head:      Right side of head: No submental, submandibular, tonsillar, preauricular, posterior auricular or occipital adenopathy.      Left side of head: No submental, submandibular,  tonsillar, preauricular, posterior auricular or occipital adenopathy.      Cervical: No cervical adenopathy.      Right cervical: No superficial, deep or posterior cervical adenopathy.     Left cervical: No superficial, deep or posterior cervical adenopathy.      Upper Body:      Right upper body: No supraclavicular adenopathy.      Left upper body: No supraclavicular adenopathy.   Skin:     General: Skin is warm and dry.      Capillary Refill: Capillary refill takes 2 to 3 seconds.      Comments: Left lateral knee with puncture wound and scraping. Site erythematous with some scabbing to puncture site. Site cool to touch, tender with palpation.    Neurological:      General: No focal deficit present.      Mental Status: She is alert and oriented to person, place, and time.      Cranial Nerves: Cranial nerves are intact. No cranial nerve deficit.      Sensory: Sensation is intact.      Motor: Motor function is intact.      Coordination: Coordination is intact.      Gait: Gait is intact.   Psychiatric:         Attention and Perception: Attention and perception normal.         Mood and Affect: Mood normal.         Speech: Speech normal.         Behavior: Behavior normal. Behavior is cooperative.         Thought Content: Thought content normal.         Cognition and Memory: Cognition and memory normal.         Judgment: Judgment normal.         Procedures    Diagnoses and all orders for this visit:    1. Annual physical exam (Primary)  -     CBC & Differential  -     Comprehensive Metabolic Panel  -     Lipid Panel With / Chol / HDL Ratio    2. Screening for hyperlipidemia  -     Lipid Panel With / Chol / HDL Ratio    3. Anxiety  -     Comprehensive Metabolic Panel  -     sertraline (ZOLOFT) 50 MG tablet; Take 1 tablet by mouth Daily.  Dispense: 90 tablet; Refill: 1    4. Encounter for screening for other metabolic disorders  -     CBC & Differential    5. Dog bite, initial encounter  -     amoxicillin-clavulanate  (Augmentin) 875-125 MG per tablet; Take 1 tablet by mouth 2 (Two) Times a Day for 10 days.  Dispense: 20 tablet; Refill: 0        Problems Addressed this Visit    None     Visit Diagnoses     Annual physical exam    -  Primary    Relevant Orders    CBC & Differential    Comprehensive Metabolic Panel    Lipid Panel With / Chol / HDL Ratio    Screening for hyperlipidemia        Relevant Orders    Lipid Panel With / Chol / HDL Ratio    Anxiety        Relevant Medications    sertraline (ZOLOFT) 50 MG tablet    Other Relevant Orders    Comprehensive Metabolic Panel    Encounter for screening for other metabolic disorders        Relevant Orders    CBC & Differential    Dog bite, initial encounter        Relevant Medications    amoxicillin-clavulanate (Augmentin) 875-125 MG per tablet      Diagnoses       Codes Comments    Annual physical exam    -  Primary ICD-10-CM: Z00.00  ICD-9-CM: V70.0     Screening for hyperlipidemia     ICD-10-CM: Z13.220  ICD-9-CM: V77.91     Anxiety     ICD-10-CM: F41.9  ICD-9-CM: 300.00     Encounter for screening for other metabolic disorders     ICD-10-CM: Z13.228  ICD-9-CM: V77.99     Dog bite, initial encounter     ICD-10-CM: W54.0XXA  ICD-9-CM: 879.8, E906.0         CMP  Lipid panel  CBC  Augmentin 875-160 mg 1 p.o. BID X 10 days.   Routine health maintenance reviewed and discussed with Liz Pool.      Return in about 1 year (around 3/15/2023) for Annual, Labs.

## 2022-03-15 NOTE — PATIENT INSTRUCTIONS
I will call you with your lab results.   Please call with any questions or concerns.    Return in about 1 year (around 3/15/2023) for Annual, Labs.    Annual Wellness  Personal Prevention Plan of Service     Date of Office Visit:    Encounter Provider:  MANE Devlin  Place of Service:  [facility]  Patient Name: Liz Pool  :  2000    As part of the Annual Wellness portion of your visit today, we are providing you with this personalized preventive plan of services (PPPS). This plan is based upon recommendations of the [place] Preventive Services Task Force (USPSTF) and the Advisory Committee on Immunization Practices (ACIP).    This lists the preventive care services that should be considered, and provides dates of when you are due. Items listed as completed are up-to-date and do not require any further intervention.    Health Maintenance   Topic Date Due    HPV VACCINES (1 - 2-dose series) Never done    TDAP/TD VACCINES (2 - Td or Tdap) 2018    INFLUENZA VACCINE  Never done    CHLAMYDIA SCREENING  03/15/2022 (Originally 3/11/2021)    ANNUAL PHYSICAL  2023    PAP SMEAR  2024    HEPATITIS C SCREENING  Completed    COVID-19 Vaccine  Completed    Pneumococcal Vaccine 0-64  Aged Out       Orders Placed This Encounter   Procedures    Comprehensive Metabolic Panel     Order Specific Question:   Release to patient     Answer:   Immediate    Lipid Panel With / Chol / HDL Ratio     Order Specific Question:   Release to patient     Answer:   Immediate    CBC & Differential       Return in about 1 year (around 3/15/2023) for Annual, Labs.           MVC

## 2022-03-16 LAB
ALBUMIN SERPL-MCNC: 4.6 G/DL (ref 3.9–5)
ALBUMIN/GLOB SERPL: 1.8 {RATIO} (ref 1.2–2.2)
ALP SERPL-CCNC: 59 IU/L (ref 44–121)
ALT SERPL-CCNC: 9 IU/L (ref 0–32)
AST SERPL-CCNC: 14 IU/L (ref 0–40)
BASOPHILS # BLD AUTO: 0.1 X10E3/UL (ref 0–0.2)
BASOPHILS NFR BLD AUTO: 1 %
BILIRUB SERPL-MCNC: 0.3 MG/DL (ref 0–1.2)
BUN SERPL-MCNC: 17 MG/DL (ref 6–20)
BUN/CREAT SERPL: 25 (ref 9–23)
CALCIUM SERPL-MCNC: 9.3 MG/DL (ref 8.7–10.2)
CHLORIDE SERPL-SCNC: 103 MMOL/L (ref 96–106)
CHOLEST SERPL-MCNC: 199 MG/DL (ref 100–199)
CHOLEST/HDLC SERPL: 5 RATIO (ref 0–4.4)
CO2 SERPL-SCNC: 21 MMOL/L (ref 20–29)
CREAT SERPL-MCNC: 0.68 MG/DL (ref 0.57–1)
EGFRCR SERPLBLD CKD-EPI 2021: 125 ML/MIN/1.73
EOSINOPHIL # BLD AUTO: 0.5 X10E3/UL (ref 0–0.4)
EOSINOPHIL NFR BLD AUTO: 9 %
ERYTHROCYTE [DISTWIDTH] IN BLOOD BY AUTOMATED COUNT: 11.8 % (ref 11.7–15.4)
GLOBULIN SER CALC-MCNC: 2.5 G/DL (ref 1.5–4.5)
GLUCOSE SERPL-MCNC: 86 MG/DL (ref 65–99)
HCT VFR BLD AUTO: 40 % (ref 34–46.6)
HDLC SERPL-MCNC: 40 MG/DL
HGB BLD-MCNC: 13.2 G/DL (ref 11.1–15.9)
IMM GRANULOCYTES # BLD AUTO: 0 X10E3/UL (ref 0–0.1)
IMM GRANULOCYTES NFR BLD AUTO: 0 %
LDLC SERPL CALC-MCNC: 147 MG/DL (ref 0–99)
LYMPHOCYTES # BLD AUTO: 1.9 X10E3/UL (ref 0.7–3.1)
LYMPHOCYTES NFR BLD AUTO: 34 %
MCH RBC QN AUTO: 29.9 PG (ref 26.6–33)
MCHC RBC AUTO-ENTMCNC: 33 G/DL (ref 31.5–35.7)
MCV RBC AUTO: 91 FL (ref 79–97)
MONOCYTES # BLD AUTO: 0.4 X10E3/UL (ref 0.1–0.9)
MONOCYTES NFR BLD AUTO: 7 %
NEUTROPHILS # BLD AUTO: 2.6 X10E3/UL (ref 1.4–7)
NEUTROPHILS NFR BLD AUTO: 49 %
PLATELET # BLD AUTO: 248 X10E3/UL (ref 150–450)
POTASSIUM SERPL-SCNC: 4.6 MMOL/L (ref 3.5–5.2)
PROT SERPL-MCNC: 7.1 G/DL (ref 6–8.5)
RBC # BLD AUTO: 4.41 X10E6/UL (ref 3.77–5.28)
SODIUM SERPL-SCNC: 138 MMOL/L (ref 134–144)
TRIGL SERPL-MCNC: 65 MG/DL (ref 0–149)
VLDLC SERPL CALC-MCNC: 12 MG/DL (ref 5–40)
WBC # BLD AUTO: 5.4 X10E3/UL (ref 3.4–10.8)

## 2022-11-28 ENCOUNTER — OFFICE VISIT (OUTPATIENT)
Dept: FAMILY MEDICINE CLINIC | Facility: CLINIC | Age: 22
End: 2022-11-28

## 2022-11-28 VITALS
OXYGEN SATURATION: 98 % | DIASTOLIC BLOOD PRESSURE: 70 MMHG | WEIGHT: 205 LBS | RESPIRATION RATE: 18 BRPM | BODY MASS INDEX: 36.32 KG/M2 | SYSTOLIC BLOOD PRESSURE: 120 MMHG | HEART RATE: 94 BPM | HEIGHT: 63 IN

## 2022-11-28 DIAGNOSIS — Z92.89 HISTORY OF RECENT HOSPITALIZATION: Primary | ICD-10-CM

## 2022-11-28 DIAGNOSIS — J10.1 INFLUENZA A: ICD-10-CM

## 2022-11-28 DIAGNOSIS — Z23 NEED FOR VACCINATION: ICD-10-CM

## 2022-11-28 DIAGNOSIS — L03.011 CELLULITIS OF FINGER OF RIGHT HAND: ICD-10-CM

## 2022-11-28 DIAGNOSIS — F41.9 ANXIETY: ICD-10-CM

## 2022-11-28 PROBLEM — F32.A DEPRESSIVE DISORDER: Status: ACTIVE | Noted: 2022-11-28

## 2022-11-28 PROCEDURE — 99214 OFFICE O/P EST MOD 30 MIN: CPT | Performed by: NURSE PRACTITIONER

## 2022-11-28 PROCEDURE — 90471 IMMUNIZATION ADMIN: CPT | Performed by: NURSE PRACTITIONER

## 2022-11-28 PROCEDURE — 90686 IIV4 VACC NO PRSV 0.5 ML IM: CPT | Performed by: NURSE PRACTITIONER

## 2022-11-28 RX ORDER — DOXYCYCLINE HYCLATE 100 MG
100 TABLET ORAL 2 TIMES DAILY
Qty: 10 TABLET | Refills: 0 | Status: SHIPPED | OUTPATIENT
Start: 2022-11-28 | End: 2022-12-03

## 2022-11-28 NOTE — PROGRESS NOTES
Subjective   Liz Pool is a 22 y.o. female.     History of Present Illness   Patient presents for follow-up for recent hospitalization. She was seen at the [facility] and was sent to [facility] and admitted for hypoxia. Patient reports that she had positive flu test a few days before. She states that her oxygen level would not stay above 85%. Patient reports that she was in the hospital for 6 days. Her Side oxygen saturation at today's visit is 98 %. Patient was treated inpatient with steroids and bronchodilators. She reports that she just finished the antibiotics that were prescribed. Patient reports that she has stopped smoking since her hospitalization.     Patient is also here for infection of the ring finger of her right hand. She reports that this started about 3 days ago. She states that she has been using peroxide and topical antibiotic ointment.     Patient is also requesting refill of their zoloft. She reports that she is doing well on the medications and denies any adverse side affects.     The following portions of the patient's history were reviewed and updated as appropriate: allergies, current medications, past family history, past medical history, past social history, past surgical history and problem list.    Review of Systems   Constitutional: Negative for chills, fatigue and fever.   Respiratory: Negative for cough, chest tightness, shortness of breath and wheezing.    Cardiovascular: Negative for chest pain, palpitations and leg swelling.   Neurological: Negative for dizziness, numbness and headache.   Hematological: Negative.    Psychiatric/Behavioral: Negative.  Negative for sleep disturbance.       Objective   Physical Exam  Vitals and nursing note reviewed.   Constitutional:       Appearance: Normal appearance. She is well-developed. She is obese.   HENT:      Head: Normocephalic and atraumatic.   Eyes:      Conjunctiva/sclera: Conjunctivae normal.      Pupils: Pupils are equal,  round, and reactive to light.   Cardiovascular:      Rate and Rhythm: Normal rate and regular rhythm.      Heart sounds: Normal heart sounds. No murmur heard.  Pulmonary:      Effort: Pulmonary effort is normal.      Breath sounds: Normal breath sounds.   Skin:     Comments: Ring finger of right hand with redness and swelling, warm to touch.    Neurological:      Mental Status: She is alert and oriented to person, place, and time.   Psychiatric:         Attention and Perception: Attention and perception normal.         Mood and Affect: Mood and affect normal.         Speech: Speech normal.         Behavior: Behavior is uncooperative.         Thought Content: Thought content normal.         Cognition and Memory: Cognition and memory normal.         Judgment: Judgment normal.         Vitals:    11/28/22 1503   BP: 120/70   Pulse: 94   Resp: 18   SpO2: 98%     Body mass index is 36.31 kg/m².      Procedures    Assessment & Plan   Problems Addressed this Visit        Mental Health    Anxiety    Relevant Medications    sertraline (ZOLOFT) 50 MG tablet   Other Visit Diagnoses     History of recent hospitalization    -  Primary    Influenza A        Cellulitis of finger of right hand        Relevant Medications    doxycycline (VIBRAMYICN) 100 MG tablet    Need for vaccination        Relevant Orders    FluLaval/Fluarix/Fluzone >6 Months      Diagnoses       Codes Comments    History of recent hospitalization    -  Primary ICD-10-CM: Z92.89  ICD-9-CM: V13.9     Influenza A     ICD-10-CM: J10.1  ICD-9-CM: 487.1     Cellulitis of finger of right hand     ICD-10-CM: L03.011  ICD-9-CM: 681.00     Need for vaccination     ICD-10-CM: Z23  ICD-9-CM: V05.9     Anxiety     ICD-10-CM: F41.9  ICD-9-CM: 300.00           Reviewed recent hospitalization records and tests  Doxycycline 100mg 1p.o. BID X 5 days  Flu vaccine  Zoloft 50mg 1 p.o. QD         Return if symptoms worsen or fail to improve.

## 2023-02-07 ENCOUNTER — TELEPHONE (OUTPATIENT)
Dept: FAMILY MEDICINE CLINIC | Facility: CLINIC | Age: 23
End: 2023-02-07

## 2023-02-07 NOTE — TELEPHONE ENCOUNTER
Caller: Liz Pool    Relationship: Self    Best call back number: [phone]    What is the best time to reach you: ANY TIME    Who are you requesting to speak with (clinical staff, provider,  specific staff member): CLINICAL STAFF    What was the call regarding: PATIENT STATES THAT SHE LOST HER sertraline (ZOLOFT) 50 MG tablet MEDICATION AND HAS NOT TAKEN IT IN APPROXIMATELY 14 DAYS.  SHE SAYS SHE THINKS THAT HER CAT KNOCKED THE MEDICINE BOTTLE INTO THE GARBAGE CAN.  SHE REQUESTS A REPLACEMENT PRESCRIPTION FOR THIS MEDICATION BE SENT TO HER PHARMACY.    [id number] [place], [place] - [address] AT [facility] OF [address]  - [phone]  - [phone] FX    Do you require a callback: YES    PLEASE ADVISE.

## 2023-02-08 DIAGNOSIS — F41.9 ANXIETY: ICD-10-CM

## 2023-11-03 DIAGNOSIS — F41.9 ANXIETY: ICD-10-CM

## 2025-02-08 PROCEDURE — 99284 EMERGENCY DEPT VISIT MOD MDM: CPT

## 2025-02-09 ENCOUNTER — APPOINTMENT (OUTPATIENT)
Dept: CT IMAGING | Facility: HOSPITAL | Age: 25
End: 2025-02-09
Payer: MEDICAID

## 2025-02-09 ENCOUNTER — HOSPITAL ENCOUNTER (EMERGENCY)
Facility: HOSPITAL | Age: 25
Discharge: HOME | End: 2025-02-09
Attending: EMERGENCY MEDICINE | Admitting: EMERGENCY MEDICINE
Payer: MEDICAID

## 2025-02-09 VITALS
WEIGHT: 210 LBS | RESPIRATION RATE: 16 BRPM | SYSTOLIC BLOOD PRESSURE: 120 MMHG | HEIGHT: 63 IN | HEART RATE: 61 BPM | OXYGEN SATURATION: 97 % | DIASTOLIC BLOOD PRESSURE: 82 MMHG | TEMPERATURE: 96 F | BODY MASS INDEX: 37.21 KG/M2

## 2025-02-09 DIAGNOSIS — N39.0 ACUTE UTI: Primary | ICD-10-CM

## 2025-02-09 LAB
ALBUMIN SERPL-MCNC: 4.2 G/DL (ref 3.5–5.2)
ALBUMIN/GLOB SERPL: 1.6 G/DL
ALP SERPL-CCNC: 67 U/L (ref 39–117)
ALT SERPL W P-5'-P-CCNC: 22 U/L (ref 1–33)
ANION GAP SERPL CALCULATED.3IONS-SCNC: 10 MMOL/L (ref 5–15)
AST SERPL-CCNC: 21 U/L (ref 1–32)
B-HCG UR QL: NEGATIVE
BACTERIA UR QL AUTO: ABNORMAL /HPF
BASOPHILS # BLD AUTO: 0.06 10*3/MM3 (ref 0–0.2)
BASOPHILS NFR BLD AUTO: 0.6 % (ref 0–1.5)
BILIRUB SERPL-MCNC: 0.2 MG/DL (ref 0–1.2)
BILIRUB UR QL STRIP: NEGATIVE
BUN SERPL-MCNC: 12 MG/DL (ref 6–20)
BUN/CREAT SERPL: 16.9 (ref 7–25)
CALCIUM SPEC-SCNC: 8.9 MG/DL (ref 8.6–10.5)
CHLORIDE SERPL-SCNC: 104 MMOL/L (ref 98–107)
CLARITY UR: ABNORMAL
CO2 SERPL-SCNC: 24 MMOL/L (ref 22–29)
COLOR UR: YELLOW
CREAT SERPL-MCNC: 0.71 MG/DL (ref 0.57–1)
DEPRECATED RDW RBC AUTO: 39.7 FL (ref 37–54)
EGFRCR SERPLBLD CKD-EPI 2021: 120.4 ML/MIN/1.73
EOSINOPHIL # BLD AUTO: 0.67 10*3/MM3 (ref 0–0.4)
EOSINOPHIL NFR BLD AUTO: 7 % (ref 0.3–6.2)
ERYTHROCYTE [DISTWIDTH] IN BLOOD BY AUTOMATED COUNT: 11.8 % (ref 12.3–15.4)
GLOBULIN UR ELPH-MCNC: 2.6 GM/DL
GLUCOSE SERPL-MCNC: 78 MG/DL (ref 65–99)
GLUCOSE UR STRIP-MCNC: NEGATIVE MG/DL
HCT VFR BLD AUTO: 39.8 % (ref 34–46.6)
HGB BLD-MCNC: 13.2 G/DL (ref 12–15.9)
HGB UR QL STRIP.AUTO: NEGATIVE
HOLD SPECIMEN: NORMAL
HOLD SPECIMEN: NORMAL
HYALINE CASTS UR QL AUTO: ABNORMAL /LPF
IMM GRANULOCYTES # BLD AUTO: 0.02 10*3/MM3 (ref 0–0.05)
IMM GRANULOCYTES NFR BLD AUTO: 0.2 % (ref 0–0.5)
KETONES UR QL STRIP: NEGATIVE
LEUKOCYTE ESTERASE UR QL STRIP.AUTO: ABNORMAL
LYMPHOCYTES # BLD AUTO: 3.55 10*3/MM3 (ref 0.7–3.1)
LYMPHOCYTES NFR BLD AUTO: 37.2 % (ref 19.6–45.3)
MCH RBC QN AUTO: 30 PG (ref 26.6–33)
MCHC RBC AUTO-ENTMCNC: 33.2 G/DL (ref 31.5–35.7)
MCV RBC AUTO: 90.5 FL (ref 79–97)
MONOCYTES # BLD AUTO: 0.71 10*3/MM3 (ref 0.1–0.9)
MONOCYTES NFR BLD AUTO: 7.4 % (ref 5–12)
NEUTROPHILS NFR BLD AUTO: 4.53 10*3/MM3 (ref 1.7–7)
NEUTROPHILS NFR BLD AUTO: 47.6 % (ref 42.7–76)
NITRITE UR QL STRIP: NEGATIVE
NRBC BLD AUTO-RTO: 0 /100 WBC (ref 0–0.2)
PH UR STRIP.AUTO: 6.5 [PH] (ref 5–8)
PLATELET # BLD AUTO: 248 10*3/MM3 (ref 140–450)
PMV BLD AUTO: 10.4 FL (ref 6–12)
POTASSIUM SERPL-SCNC: 3.6 MMOL/L (ref 3.5–5.2)
PROT SERPL-MCNC: 6.8 G/DL (ref 6–8.5)
PROT UR QL STRIP: NEGATIVE
RBC # BLD AUTO: 4.4 10*6/MM3 (ref 3.77–5.28)
RBC # UR STRIP: ABNORMAL /HPF
REF LAB TEST METHOD: ABNORMAL
SODIUM SERPL-SCNC: 138 MMOL/L (ref 136–145)
SP GR UR STRIP: 1.01 (ref 1–1.03)
SQUAMOUS #/AREA URNS HPF: ABNORMAL /HPF
UROBILINOGEN UR QL STRIP: ABNORMAL
WBC # UR STRIP: ABNORMAL /HPF
WBC NRBC COR # BLD AUTO: 9.54 10*3/MM3 (ref 3.4–10.8)
WHOLE BLOOD HOLD COAG: NORMAL
WHOLE BLOOD HOLD SPECIMEN: NORMAL

## 2025-02-09 PROCEDURE — 85025 COMPLETE CBC W/AUTO DIFF WBC: CPT | Performed by: EMERGENCY MEDICINE

## 2025-02-09 PROCEDURE — 81001 URINALYSIS AUTO W/SCOPE: CPT | Performed by: EMERGENCY MEDICINE

## 2025-02-09 PROCEDURE — 36415 COLL VENOUS BLD VENIPUNCTURE: CPT

## 2025-02-09 PROCEDURE — 74176 CT ABD & PELVIS W/O CONTRAST: CPT

## 2025-02-09 PROCEDURE — 80053 COMPREHEN METABOLIC PANEL: CPT | Performed by: EMERGENCY MEDICINE

## 2025-02-09 PROCEDURE — 81025 URINE PREGNANCY TEST: CPT | Performed by: PHYSICIAN ASSISTANT

## 2025-02-09 RX ORDER — CEPHALEXIN 500 MG/1
500 CAPSULE ORAL 2 TIMES DAILY
Qty: 14 CAPSULE | Refills: 0 | Status: SHIPPED | OUTPATIENT
Start: 2025-02-09 | End: 2025-02-16

## 2025-02-09 NOTE — ED PROVIDER NOTES
I have personally performed a face-to-face diagnostic evaluation of the patient.  I have reviewed and agree with the care plan as outlined by NP/PA.  My findings are as follows:    HPI:  Patient is a 24 y.o. female who presents with suprapubic pain, dysuria, urinary frequency and right flank pain.      PE:  Physical Exam  Constitutional:       Appearance: Normal appearance.   HENT:      Head: Normocephalic and atraumatic.   Eyes:      Conjunctiva/sclera: Conjunctivae normal.   Pulmonary:      Effort: Pulmonary effort is normal. No respiratory distress.   Abdominal:      Tenderness: There is abdominal tenderness (Suprapubic). There is no guarding or rebound.   Neurological:      Mental Status: She is alert and oriented to person, place, and time.           MDM:  I provided a substantiate portion of the care of this patient. I personally performed the medical decision making.    Medical records are reviewed in [facility] and Care Everywhere, if applicable       Latest Reference Range & Units 02/09/25 01:12 02/09/25 01:18   Sodium 136 - 145 mmol/L 138    Potassium 3.5 - 5.2 mmol/L 3.6    Chloride 98 - 107 mmol/L 104    CO2 22.0 - 29.0 mmol/L 24.0    Anion Gap 5.0 - 15.0 mmol/L 10.0    BUN 6 - 20 mg/dL 12    Creatinine 0.57 - 1.00 mg/dL 0.71    BUN/Creatinine Ratio 7.0 - 25.0  16.9    eGFR >60.0 mL/min/1.73 120.4    Glucose 65 - 99 mg/dL 78    Calcium 8.6 - 10.5 mg/dL 8.9    Alkaline Phosphatase 39 - 117 U/L 67    Total Protein 6.0 - 8.5 g/dL 6.8    Albumin 3.5 - 5.2 g/dL 4.2    Globulin gm/dL 2.6    A/G Ratio g/dL 1.6    AST (SGOT) 1 - 32 U/L 21    ALT (SGPT) 1 - 33 U/L 22    Total Bilirubin 0.0 - 1.2 mg/dL 0.2    WBC 3.40 - 10.80 10*3/mm3 9.54    RBC 3.77 - 5.28 10*6/mm3 4.40    Hemoglobin 12.0 - 15.9 g/dL 13.2    Hematocrit 34.0 - 46.6 % 39.8    Platelets 140 - 450 10*3/mm3 248    RDW 12.3 - 15.4 % 11.8 (L)    MCV 79.0 - 97.0 fL 90.5    MCH 26.6 - 33.0 pg 30.0    MCHC 31.5 - 35.7 g/dL 33.2    MPV 6.0 - 12.0 fL 10.4     RDW-SD 37.0 - 54.0 fl 39.7    Neutrophil Rel % 42.7 - 76.0 % 47.6    Lymphocyte Rel % 19.6 - 45.3 % 37.2    Monocyte Rel % 5.0 - 12.0 % 7.4    Eosinophil Rel % 0.3 - 6.2 % 7.0 (H)    Basophil Rel % 0.0 - 1.5 % 0.6    Immature Granulocyte Rel % 0.0 - 0.5 % 0.2    Neutrophils Absolute 1.70 - 7.00 10*3/mm3 4.53    Lymphocytes Absolute 0.70 - 3.10 10*3/mm3 3.55 (H)    Monocytes Absolute 0.10 - 0.90 10*3/mm3 0.71    Eosinophils Absolute 0.00 - 0.40 10*3/mm3 0.67 (H)    Basophils Absolute 0.00 - 0.20 10*3/mm3 0.06    Immature Grans, Absolute 0.00 - 0.05 10*3/mm3 0.02    nRBC 0.0 - 0.2 /100 WBC 0.0    Color, UA Yellow, Straw   Yellow   Appearance, UA Clear   Cloudy !   Specific Gravity, UA 1.005 - 1.030   1.007   pH, UA 5.0 - 8.0   6.5   Glucose Negative   Negative   Ketones, UA Negative   Negative   Blood, UA Negative   Negative   Nitrite, UA Negative   Negative   Leukocytes, UA Negative   Moderate (2+) !   Protein, UA Negative   Negative   Bilirubin, UA Negative   Negative   Urobilinogen, UA 0.2 - 1.0 E.U./dL   0.2 E.U./dL   RBC, UA None Seen, 0-2 /HPF  0-2   WBC, UA None Seen, 0-2 /HPF  11-20 !   Bacteria, UA None Seen /HPF  1+ !   Squamous Epithelial Cells, UA None Seen, 0-2 /HPF  3-6 !   Hyaline Casts, UA None Seen /LPF  0-2   Methodology:   Automated Microscopy   HCG, Urine QL Negative   Negative   (L): Data is abnormally low  (H): Data is abnormally high  !: Data is abnormal    I have reviewed the above labs    CT ABDOMEN PELVIS WO CONTRAST-     INDICATIONS: Right flank pain     TECHNIQUE: Radiation dose reduction techniques were utilized, including  automated exposure control and exposure modulation based on body size.  Unenhanced ABDOMEN AND PELVIS CT     COMPARISON: None available     FINDINGS:   Unremarkable unenhanced appearance of the liver, gallbladder, spleen,  adrenal glands, pancreas, kidneys, bladder. A splenule is evident.     No bowel obstruction or abnormal bowel thickening is identified.  The  appendix does not appear inflamed. Mild to moderate colonic fecal  retention is apparent.     No free intraperitoneal gas or free fluid.     Scattered small mesenteric and para-aortic lymph nodes are seen that are  not significant by size criteria.     Abdominal aorta is not aneurysmal.           The lung bases show minimal atelectasis/scarring.      No acute fracture is identified.           IMPRESSION:     1. No urolithiasis or hydronephrosis.     2. No acute inflammatory process of bowel is identified. Follow-up as  indications persist.     This report was finalized on 2/9/2025 8:57 AM by Dr. Partha Chandler M.D on Workstation: [id number]    My independent interpretation of the CT abdomen and pelvis: No pneumoperitoneum      This is a well appearing 24 year old female who is presenting today secondary to dysuria and flank pain. She presents afebrile with a benign exam- no evidence of peritonitis or other acute surgical exam findings. She was evaluated with labs, which are reviewed and reassuring. UA in the setting of symptoms suggestive of infection. She was evaluated with a CT of the abdomen which did not demonstrate any evidence of obstructive uropathy. She is appropriate for a trial of treatment as an outpatient with follow-up.         Impression:  Final diagnoses:   Acute UTI         Disposition:  ED Disposition       ED Disposition   Discharge    Condition   Stable    Comment   --                Danielle Jay MD  02/10/25 0608

## 2025-02-09 NOTE — ED PROVIDER NOTES
EMERGENCY DEPARTMENT ENCOUNTER  Room Number:  HA1/A  PCP: Mojgan Machuca APRN  Independent Historians: Patient      HPI:  Chief Complaint: had concerns including Urinary Frequency.     A complete HPI/ROS/PMH/PSH/SH/FH are unobtainable due to: None    Chronic or social conditions impacting patient care (Social Determinants of Health): None      Context: Liz Pool is a 24 y.o. female with a medical history of asthma and depression who presents to the ED c/o acute dysuria and abdominal pain.  Patient reports for the last several days she has noted some dysuria and urinary frequency.  Tonight, after eating dinner she had a sharp pain over the lower abdomen.  Has noted some right low back pain over the last several days.  No reported fever, nausea, vomiting.  No history of kidney stones.  No prior abdominal surgeries.  No other systemic complaints at this time.      Review of prior external notes (non-ED) -and- Review of prior external test results outside of this encounter:  Patient seen in office by PCP on 6/23/2023 for lower abdominal pain and diarrhea.  Reviewed assessment and plan.  Will check labs and refer patient to gastroenterology.  Reviewed labs collected on 6/23/2023.  CBC with hemoglobin 13.6, CMP with creatinine 0.79.    Prescription drug monitoring program review:     N/A    PAST MEDICAL HISTORY  Active Ambulatory Problems     Diagnosis Date Noted   • Sebaceous cyst 01/05/2021   • Closed fracture of middle or proximal phalanx or phalanges of hand 03/15/2022   • Anxiety 11/28/2022   • Depressive disorder 11/28/2022     Resolved Ambulatory Problems     Diagnosis Date Noted   • No Resolved Ambulatory Problems     Past Medical History:   Diagnosis Date   • Asthma    • Depression          PAST SURGICAL HISTORY  Past Surgical History:   Procedure Laterality Date   • WISDOM TOOTH EXTRACTION           FAMILY HISTORY  Family History   Problem Relation Age of Onset   • Bipolar disorder Mother    • Diabetes  Mother    • Hypertension Mother    • Bipolar disorder Father    • Diabetes Father    • Hypertension Father    • Breast cancer Neg Hx    • Ovarian cancer Neg Hx    • Endometrial cancer Neg Hx    • Colon cancer Neg Hx    • Deep vein thrombosis Neg Hx    • Pulmonary embolism Neg Hx          SOCIAL HISTORY  Social History     Socioeconomic History   • Marital status: Single   Tobacco Use   • Smoking status: Every Day     Types: Cigarettes   • Smokeless tobacco: Never   • Tobacco comments:     e-cigarette   Vaping Use   • Vaping status: Every Day   • Substances: Nicotine   • Devices: Disposable   Substance and Sexual Activity   • Alcohol use: Not Currently     Comment: rarely 1/mo   • Drug use: Not Currently     Types: Marijuana     Comment: occasionally   • Sexual activity: Defer     Partners: Female     Comment: on birth control pill         ALLERGIES  Patient has no known allergies.      REVIEW OF SYSTEMS  Included in HPI  All systems reviewed and negative except for those discussed in HPI.      PHYSICAL EXAM    I have reviewed the triage vital signs and nursing notes.    ED Triage Vitals   Temp Heart Rate Resp BP SpO2   02/08/25 2329 02/08/25 2329 02/08/25 2329 02/08/25 2340 02/08/25 2329   96 °F (35.6 °C) 83 16 143/85 100 %      Temp src Heart Rate Source Patient Position BP Location FiO2 (%)   02/08/25 2329 -- -- -- --   Tympanic           Physical Exam  Constitutional:       General: She is not in acute distress.     Appearance: She is well-developed.   HENT:      Head: Normocephalic and atraumatic.   Eyes:      Extraocular Movements: Extraocular movements intact.   Cardiovascular:      Rate and Rhythm: Normal rate and regular rhythm.      Heart sounds: Normal heart sounds.   Pulmonary:      Effort: Pulmonary effort is normal.      Breath sounds: Normal breath sounds.   Abdominal:      General: There is no distension.      Tenderness: There is abdominal tenderness in the suprapubic area. There is no right CVA  tenderness, left CVA tenderness or guarding.   Skin:     General: Skin is warm.   Neurological:      General: No focal deficit present.      Mental Status: She is alert and oriented to person, place, and time.   Psychiatric:         Mood and Affect: Mood normal.             LAB RESULTS  Recent Results (from the past 24 hours)   Comprehensive Metabolic Panel    Collection Time: 02/09/25  1:12 AM    Specimen: Blood   Result Value Ref Range    Glucose 78 65 - 99 mg/dL    BUN 12 6 - 20 mg/dL    Creatinine 0.71 0.57 - 1.00 mg/dL    Sodium 138 136 - 145 mmol/L    Potassium 3.6 3.5 - 5.2 mmol/L    Chloride 104 98 - 107 mmol/L    CO2 24.0 22.0 - 29.0 mmol/L    Calcium 8.9 8.6 - 10.5 mg/dL    Total Protein 6.8 6.0 - 8.5 g/dL    Albumin 4.2 3.5 - 5.2 g/dL    ALT (SGPT) 22 1 - 33 U/L    AST (SGOT) 21 1 - 32 U/L    Alkaline Phosphatase 67 39 - 117 U/L    Total Bilirubin 0.2 0.0 - 1.2 mg/dL    Globulin 2.6 gm/dL    A/G Ratio 1.6 g/dL    BUN/Creatinine Ratio 16.9 7.0 - 25.0    Anion Gap 10.0 5.0 - 15.0 mmol/L    eGFR 120.4 >60.0 mL/min/1.73   Green Top (Gel)    Collection Time: 02/09/25  1:12 AM   Result Value Ref Range    Extra Tube Hold for add-ons.    Lavender Top    Collection Time: 02/09/25  1:12 AM   Result Value Ref Range    Extra Tube hold for add-on    Gold Top - SST    Collection Time: 02/09/25  1:12 AM   Result Value Ref Range    Extra Tube Hold for add-ons.    Light Blue Top    Collection Time: 02/09/25  1:12 AM   Result Value Ref Range    Extra Tube Hold for add-ons.    CBC Auto Differential    Collection Time: 02/09/25  1:12 AM    Specimen: Blood   Result Value Ref Range    WBC 9.54 3.40 - 10.80 10*3/mm3    RBC 4.40 3.77 - 5.28 10*6/mm3    Hemoglobin 13.2 12.0 - 15.9 g/dL    Hematocrit 39.8 34.0 - 46.6 %    MCV 90.5 79.0 - 97.0 fL    MCH 30.0 26.6 - 33.0 pg    MCHC 33.2 31.5 - 35.7 g/dL    RDW 11.8 (L) 12.3 - 15.4 %    RDW-SD 39.7 37.0 - 54.0 fl    MPV 10.4 6.0 - 12.0 fL    Platelets 248 140 - 450 10*3/mm3     Neutrophil % 47.6 42.7 - 76.0 %    Lymphocyte % 37.2 19.6 - 45.3 %    Monocyte % 7.4 5.0 - 12.0 %    Eosinophil % 7.0 (H) 0.3 - 6.2 %    Basophil % 0.6 0.0 - 1.5 %    Immature Grans % 0.2 0.0 - 0.5 %    Neutrophils, Absolute 4.53 1.70 - 7.00 10*3/mm3    Lymphocytes, Absolute 3.55 (H) 0.70 - 3.10 10*3/mm3    Monocytes, Absolute 0.71 0.10 - 0.90 10*3/mm3    Eosinophils, Absolute 0.67 (H) 0.00 - 0.40 10*3/mm3    Basophils, Absolute 0.06 0.00 - 0.20 10*3/mm3    Immature Grans, Absolute 0.02 0.00 - 0.05 10*3/mm3    nRBC 0.0 0.0 - 0.2 /100 WBC   Urinalysis With Microscopic If Indicated (No Culture) - Urine, Clean Catch    Collection Time: 02/09/25  1:18 AM    Specimen: Urine, Clean Catch   Result Value Ref Range    Color, UA Yellow Yellow, Straw    Appearance, UA Cloudy (A) Clear    pH, UA 6.5 5.0 - 8.0    Specific Gravity, UA 1.007 1.005 - 1.030    Glucose, UA Negative Negative    Ketones, UA Negative Negative    Bilirubin, UA Negative Negative    Blood, UA Negative Negative    Protein, UA Negative Negative    Leuk Esterase, UA Moderate (2+) (A) Negative    Nitrite, UA Negative Negative    Urobilinogen, UA 0.2 E.U./dL 0.2 - 1.0 E.U./dL   Pregnancy, Urine - Urine, Clean Catch    Collection Time: 02/09/25  1:18 AM    Specimen: Urine, Clean Catch   Result Value Ref Range    HCG, Urine QL Negative Negative   Urinalysis, Microscopic Only - Urine, Clean Catch    Collection Time: 02/09/25  1:18 AM    Specimen: Urine, Clean Catch   Result Value Ref Range    RBC, UA 0-2 None Seen, 0-2 /HPF    WBC, UA 11-20 (A) None Seen, 0-2 /HPF    Bacteria, UA 1+ (A) None Seen /HPF    Squamous Epithelial Cells, UA 3-6 (A) None Seen, 0-2 /HPF    Hyaline Casts, UA 0-2 None Seen /LPF    Methodology Automated Microscopy            MEDICATIONS GIVEN IN ER  Medications - No data to display      ORDERS PLACED DURING THIS VISIT:  Orders Placed This Encounter   Procedures   • CT Abdomen Pelvis Without Contrast   • Urinalysis With Microscopic If  Indicated (No Culture) - Urine, Clean Catch   • [place] Draw   • Comprehensive Metabolic Panel   • CBC Auto Differential   • Pregnancy, Urine - Urine, Clean Catch   • Urinalysis, Microscopic Only - Urine, Clean Catch   • CBC & Differential   • Green Top (Gel)   • Lavender Top   • Gold Top - SST   • Light Blue Top         OUTPATIENT MEDICATION MANAGEMENT:  No current Epic-ordered facility-administered medications on file.     Current Outpatient Medications Ordered in Epic   Medication Sig Dispense Refill   • albuterol sulfate  (90 Base) MCG/ACT inhaler Inhale 2 puffs Every 4 (Four) Hours As Needed for Wheezing.     • cephalexin (KEFLEX) 500 MG capsule Take 1 capsule by mouth 2 (Two) Times a Day for 7 days. 14 capsule 0   • sertraline (ZOLOFT) 50 MG tablet TAKE 1 TABLET BY MOUTH DAILY 30 tablet 0           PROGRESS, DATA ANALYSIS, CONSULTS, AND MEDICAL DECISION MAKING  All labs have been independently interpreted by me.  All radiology studies have been reviewed by me. All EKG's have been independently viewed and interpreted by me.  Discussion below represents my analysis of pertinent findings related to patient's condition, differential diagnosis, treatment plan and final disposition.    Differential diagnosis includes but is not limited to UTI, pyelonephritis, ureteral stone.        ED Course as of 02/14/25 0052   Sun Feb 09, 2025   0139 WBC: 9.54 [MP]   0139 Hemoglobin: 13.2 [MP]   0228 Nitrite, UA: Negative [MP]   0228 Leukocytes, UA(!): Moderate (2+) [MP]   0228 Blood, UA: Negative [MP]   0228 RBC, UA: 0-2 [MP]   0228 WBC, UA(!): 11-20 [MP]   0228 Bacteria, UA(!): 1+ [MP]   0228 Squamous Epithelial Cells, UA(!): 3-6 [MP]   0630 Pt care assumed by Ricco Gilbert PA-C, pending CT scan [MP]   0900 Reassuring CT scan.  Patient reassessed and appears comfortable.  Tolerating p.o. intake.  Appropriate for outpatient management. [DC]      ED Course User Index  [DC] Phil Gilbert PA  [MP] Inna Edouard,  TWIN             AS OF 06:35 EST VITALS:    BP - 143/85  HR - 83  TEMP - 96 °F (35.6 °C) (Tympanic)  O2 SATS - 100%        Please note that portions of this document were completed with a voice recognition program.    Note Disclaimer: At [facility], we believe that sharing information builds trust and better relationships. You are receiving this note because you recently visited [facility]. It is possible you will see health information before a provider has talked with you about it. This kind of information can be easy to misunderstand. To help you fully understand what it means for your health, we urge you to discuss this note with your provider.     Inna Edouard PA-C  02/09/25 0635       Inna Edouard PA-C  02/14/25 0052